# Patient Record
Sex: MALE | Race: WHITE | NOT HISPANIC OR LATINO | Employment: OTHER | URBAN - NONMETROPOLITAN AREA
[De-identification: names, ages, dates, MRNs, and addresses within clinical notes are randomized per-mention and may not be internally consistent; named-entity substitution may affect disease eponyms.]

---

## 2021-04-08 DIAGNOSIS — Z23 ENCOUNTER FOR IMMUNIZATION: ICD-10-CM

## 2023-04-06 ENCOUNTER — TRANSCRIBE ORDERS (OUTPATIENT)
Dept: CARDIOLOGY CLINIC | Facility: CLINIC | Age: 73
End: 2023-04-06

## 2023-04-06 DIAGNOSIS — R97.20 ELEVATED PROSTATE SPECIFIC ANTIGEN (PSA): Primary | ICD-10-CM

## 2023-05-22 ENCOUNTER — LAB (OUTPATIENT)
Dept: LAB | Facility: CLINIC | Age: 73
End: 2023-05-22

## 2023-05-22 ENCOUNTER — APPOINTMENT (OUTPATIENT)
Dept: LAB | Facility: CLINIC | Age: 73
End: 2023-05-22

## 2023-05-22 DIAGNOSIS — R97.20 ELEVATED PROSTATE SPECIFIC ANTIGEN (PSA): ICD-10-CM

## 2023-05-23 LAB
PSA FREE MFR SERPL: 26.4 %
PSA FREE SERPL-MCNC: 0.87 NG/ML
PSA SERPL-MCNC: 3.3 NG/ML (ref 0–4)

## 2023-05-24 ENCOUNTER — TELEPHONE (OUTPATIENT)
Dept: UROLOGY | Facility: CLINIC | Age: 73
End: 2023-05-24

## 2023-05-24 NOTE — TELEPHONE ENCOUNTER
Patient called in post voicemail and wanted to know if he needs to keep fu OV with provider since PSA is improved  Triage RN reviewed providers comments and recommends fu visit o discuss in more detail  Patient will see provider on 6/19/23

## 2023-05-24 NOTE — RESULT ENCOUNTER NOTE
Please let patient know that his repeat PSA improved to 3 3  His percent free PSA was 26 4  Given his age and percent free PSA this gives him a 9% probability of prostate cancer  I do not recommend a MRI or prostate biopsy at this time  I will see him in June for his follow-up and discuss this in more details

## 2023-05-24 NOTE — TELEPHONE ENCOUNTER
----- Message from Ben U  81  sent at 5/24/2023  8:50 AM EDT -----  Please let patient know that his repeat PSA improved to 3 3  His percent free PSA was 26 4  Given his age and percent free PSA this gives him a 9% probability of prostate cancer  I do not recommend a MRI or prostate biopsy at this time  I will see him in June for his follow-up and discuss this in more details

## 2023-06-19 ENCOUNTER — OFFICE VISIT (OUTPATIENT)
Dept: UROLOGY | Facility: CLINIC | Age: 73
End: 2023-06-19
Payer: MEDICARE

## 2023-06-19 VITALS
HEIGHT: 70 IN | TEMPERATURE: 97.5 F | DIASTOLIC BLOOD PRESSURE: 80 MMHG | SYSTOLIC BLOOD PRESSURE: 128 MMHG | BODY MASS INDEX: 22.16 KG/M2 | HEART RATE: 68 BPM | WEIGHT: 154.8 LBS | OXYGEN SATURATION: 100 %

## 2023-06-19 DIAGNOSIS — R97.20 ELEVATED PROSTATE SPECIFIC ANTIGEN (PSA): Primary | ICD-10-CM

## 2023-06-19 PROCEDURE — 99213 OFFICE O/P EST LOW 20 MIN: CPT

## 2023-06-19 NOTE — PROGRESS NOTES
6/19/2023    Chief Complaint   Patient presents with   • Follow-up     No problems or concerns       Assessment and Plan    81 y o  male     1  Elevated PSA  · PSA trend  · 3 3 (5/22/2023)  · 4 67 (3/28/2023)  · PSA free percent of 26 4% with 9% probability of prostate cancer  · PSA now normal and would not recommend a prostate biopsy or MRI imaging  Will recheck this in 6 months with follow-up  History of Present Illness  Nhi Boss is a 81 y o  male here for follow-up evaluation of elevated PSA  Patient was initially seen by me on 4/10/2023 for evaluation of an elevated PSA  He reported prior urologic care in the past over a decade ago with Dr Patrice Prabhakar for history of hydrocele status post right hydrocelectomy over 10 years ago  Denies any other Urologic history  Denies any prior history of BPH and reports mild symptoms of urinary frequency however he does drink coffee about 3 8 oz cups per day and 1 cup of tea piror to bed as well  He reports nocturia 1 times per night  His stream is adequate and denies any straining to urinate or difficulty emptying his bladder       He was referred by his PCP Dr Jordyn Reyes for evaluation of an elevated PSA  He was seen for annual exam and a PSA was ordered on 3/28/2023 as part of routine screening with PSA 4 67  He denies any known family history of prostate cancer  He is unsure if he has had PSA testing in the past and denies any prior history of elevated PSA  You have a repeat PSA total and free completed on 5/22/2023 which showed a PSA of 3 3 and a PSA free percent of 26 4 giving him a 9% probability of prostate cancer  Review of Systems   Constitutional: Negative for chills and fever  HENT: Negative for congestion and sore throat  Respiratory: Negative for cough and shortness of breath  Cardiovascular: Negative for chest pain and leg swelling  Gastrointestinal: Negative for abdominal pain, constipation and diarrhea     Genitourinary: "Negative for difficulty urinating, dysuria, frequency, hematuria and urgency  Musculoskeletal: Negative for back pain and gait problem  Skin: Negative for wound  Allergic/Immunologic: Negative for immunocompromised state  Hematological: Does not bruise/bleed easily  Vitals  Vitals:    06/19/23 0846   BP: 128/80   BP Location: Left arm   Patient Position: Sitting   Pulse: 68   Temp: 97 5 °F (36 4 °C)   SpO2: 100%   Weight: 70 2 kg (154 lb 12 8 oz)   Height: 5' 10\" (1 778 m)       Physical Exam  Vitals reviewed  Constitutional:       General: He is not in acute distress  Appearance: Normal appearance  He is not ill-appearing or toxic-appearing  HENT:      Head: Normocephalic and atraumatic  Eyes:      General: No scleral icterus  Conjunctiva/sclera: Conjunctivae normal    Cardiovascular:      Rate and Rhythm: Normal rate  Pulmonary:      Effort: Pulmonary effort is normal  No respiratory distress  Abdominal:      Tenderness: There is no right CVA tenderness or left CVA tenderness  Hernia: No hernia is present  Musculoskeletal:      Cervical back: Normal range of motion  Right lower leg: No edema  Left lower leg: No edema  Skin:     General: Skin is warm and dry  Coloration: Skin is not jaundiced or pale  Neurological:      General: No focal deficit present  Mental Status: He is alert and oriented to person, place, and time  Mental status is at baseline  Gait: Gait normal    Psychiatric:         Mood and Affect: Mood normal          Behavior: Behavior normal          Thought Content:  Thought content normal          Judgment: Judgment normal          Past History  Past Medical History:   Diagnosis Date   • Elevated PSA      Social History     Socioeconomic History   • Marital status: /Civil Union     Spouse name: None   • Number of children: None   • Years of education: None   • Highest education level: None   Occupational History   • " "None   Tobacco Use   • Smoking status: Never   • Smokeless tobacco: Never   Vaping Use   • Vaping Use: Never used   Substance and Sexual Activity   • Alcohol use: Not Currently     Comment: very rare   • Drug use: Never   • Sexual activity: Yes   Other Topics Concern   • None   Social History Narrative   • None     Social Determinants of Health     Financial Resource Strain: Not on file   Food Insecurity: Not on file   Transportation Needs: Not on file   Physical Activity: Not on file   Stress: Not on file   Social Connections: Not on file   Intimate Partner Violence: Not on file   Housing Stability: Not on file     Social History     Tobacco Use   Smoking Status Never   Smokeless Tobacco Never     Family History   Problem Relation Age of Onset   • Heart disease Father    • No Known Problems Mother        The following portions of the patient's history were reviewed and updated as appropriate allergies, current medications, past medical history, past social history, past surgical history and problem list    Imaging:    Results  No results found for this or any previous visit (from the past 1 hour(s)) ]  Lab Results   Component Value Date    PSA 3 3 05/22/2023     No results found for: \"GLUCOSE\", \"CALCIUM\", \"NA\", \"K\", \"CO2\", \"CL\", \"BUN\", \"CREATININE\"  No results found for: \"WBC\", \"HGB\", \"HCT\", \"MCV\", \"PLT\"    Please Note:  Voice dictation software has been used to create this document  There may be inadvertent transcriptions errors       SORIN Her 06/19/23   "

## 2023-12-05 ENCOUNTER — LAB (OUTPATIENT)
Dept: LAB | Facility: CLINIC | Age: 73
End: 2023-12-05
Payer: MEDICARE

## 2023-12-05 DIAGNOSIS — R97.20 ELEVATED PROSTATE SPECIFIC ANTIGEN (PSA): ICD-10-CM

## 2023-12-05 LAB — PSA SERPL-MCNC: 3.62 NG/ML (ref 0–4)

## 2023-12-05 PROCEDURE — 84153 ASSAY OF PSA TOTAL: CPT

## 2023-12-05 PROCEDURE — 36415 COLL VENOUS BLD VENIPUNCTURE: CPT

## 2023-12-26 PROBLEM — R35.0 URINARY FREQUENCY: Status: ACTIVE | Noted: 2023-12-26

## 2023-12-26 PROBLEM — Z87.898 HISTORY OF ELEVATED PSA: Status: ACTIVE | Noted: 2023-12-26

## 2023-12-26 NOTE — PROGRESS NOTES
"UROLOGY PROGRESS NOTE         NAME: Flo Copeland  AGE: 73 y.o. SEX: male  : 1950   MRN: [id number]    DATE: 2023  TIME: 11:33 AM    Assessment and Plan   Procedures     Impression:   1. Urinary frequency    2. History of elevated PSA    3. Nocturia         Plan: Follow-up in 1 year STALIN PSA patient agrees.      Chief Complaint     Chief Complaint   Patient presents with    Elevated PSA     History of Present Illness     HPI: Flo Copeland is a 73 y.o. year old male who presents with follow-up from [facility] office visit 2023.  Patient with a history of elevated PSA.  PSA was 4.6 on 3/28/2023.  It was 3.3 in May 2023.  The plan was to follow-up in 6 months it was not recommended for him to have a biopsy or an MRI.    Per [facility] note saw Dr. Salgado before with a history of hydrocele status post right hydrocelectomy.  Having some urinary frequency but does drink coffee.  Family history of prostate cancer.  No bothersome urinary complaints.  Recent PSA on 2023 3.62 stable.  No irritable or obstructive voiding complaints doing well.  Discussed his PSA results patient was pleased with the results.          The following portions of the patient's history were reviewed and updated as appropriate: allergies, current medications, past family history, past medical history, past social history, past surgical history and problem list.  Past Medical History:   Diagnosis Date    Elevated PSA      Past Surgical History:   Procedure Laterality Date    APPENDECTOMY       shoulder  Review of Systems     Const: Denies chills, fever and weight loss.  CV: Denies chest pain.  Resp: Denies SOB.  GI: Denies abdominal pain, nausea and vomiting.  : Denies symptoms other than stated above.  Musculo: Denies back pain.    Objective   /80 (BP Location: Left arm, Patient Position: Sitting, Cuff Size: Adult)   Pulse 70   Temp 97.5 °F (36.4 °C)   Ht 5' 10\" (1.778 m)   Wt 73 kg (161 lb)   SpO2 99%   BMI 23.10 " kg/m²     Physical Exam  Const: Appears healthy and well developed. No signs of acute distress present.  Resp: Respirations are regular and unlabored.   CV: Rate is regular. Rhythm is regular.  Abdomen: Abdomen is soft, nontender, and nondistended. Kidneys are not palpable.  : Normal external exam prostate slightly large smooth no masses or nodules  Psych: Patient's attitude is cooperative. Mood is normal. Affect is normal.    Current Medications   No current outpatient medications on file.        Milad Das MD

## 2023-12-28 ENCOUNTER — OFFICE VISIT (OUTPATIENT)
Dept: UROLOGY | Facility: CLINIC | Age: 73
End: 2023-12-28
Payer: MEDICARE

## 2023-12-28 VITALS
DIASTOLIC BLOOD PRESSURE: 80 MMHG | BODY MASS INDEX: 23.05 KG/M2 | OXYGEN SATURATION: 99 % | HEART RATE: 70 BPM | HEIGHT: 70 IN | TEMPERATURE: 97.5 F | SYSTOLIC BLOOD PRESSURE: 140 MMHG | WEIGHT: 161 LBS

## 2023-12-28 DIAGNOSIS — R35.1 NOCTURIA: ICD-10-CM

## 2023-12-28 DIAGNOSIS — Z87.898 HISTORY OF ELEVATED PSA: ICD-10-CM

## 2023-12-28 DIAGNOSIS — R35.0 URINARY FREQUENCY: Primary | ICD-10-CM

## 2023-12-28 PROCEDURE — 99214 OFFICE O/P EST MOD 30 MIN: CPT | Performed by: UROLOGY

## 2024-03-28 ENCOUNTER — TELEPHONE (OUTPATIENT)
Age: 74
End: 2024-03-28

## 2024-03-28 NOTE — TELEPHONE ENCOUNTER
Please send psa script to patients home.     ADDRESS:    [address] [place] [id number]

## 2024-12-03 ENCOUNTER — APPOINTMENT (OUTPATIENT)
Dept: LAB | Facility: CLINIC | Age: 74
End: 2024-12-03
Payer: MEDICARE

## 2024-12-03 DIAGNOSIS — R35.1 NOCTURIA: ICD-10-CM

## 2024-12-03 LAB — PSA SERPL-MCNC: 5.09 NG/ML (ref 0–4)

## 2024-12-03 PROCEDURE — 36415 COLL VENOUS BLD VENIPUNCTURE: CPT

## 2024-12-03 PROCEDURE — 84153 ASSAY OF PSA TOTAL: CPT

## 2024-12-06 ENCOUNTER — TELEPHONE (OUTPATIENT)
Dept: UROLOGY | Facility: CLINIC | Age: 74
End: 2024-12-06

## 2024-12-06 DIAGNOSIS — R97.20 ELEVATED PROSTATE SPECIFIC ANTIGEN (PSA): Primary | ICD-10-CM

## 2024-12-06 RX ORDER — TRIAMCINOLONE ACETONIDE 1 MG/G
CREAM TOPICAL
COMMUNITY
Start: 2024-10-01

## 2024-12-06 NOTE — TELEPHONE ENCOUNTER
Patient returned call. He had PSA done on 12/3. Appt upcoming 12/17.    PSA 12/3/24- 5.090    12/5/23-3.62

## 2024-12-07 PROBLEM — Z80.42 FAMILY HISTORY OF PROSTATE CANCER: Status: ACTIVE | Noted: 2024-12-07

## 2024-12-07 PROBLEM — R97.20 ELEVATED PSA: Status: ACTIVE | Noted: 2024-12-07

## 2024-12-07 NOTE — PROGRESS NOTES
UROLOGY PROGRESS NOTE         NAME: Flo Copeland  AGE: 74 y.o. SEX: male  : 1950   MRN: [id number]    DATE: 2024  TIME: 1:54 PM    Assessment and Plan   Procedures     Impression:   1. Urinary frequency  2. Nocturia  3. Benign prostatic hyperplasia with nocturia       Plan: Patient states does not have a family history of prostate cancer we will soniya that for next year, frequency and nocturia related to fluids, BPH without bothersome symptoms and PSA now back to normal.  Recommend observation follow-up in 1 year patient agrees.      Chief Complaint     Chief Complaint   Patient presents with    Follow-up     PSA 3.549     History of Present Illness     HPI: Flo Copeland is a 74 y.o. year old male who presents with follow-up from office visit 2023.  Patient with a history of nocturia, urinary frequency and history of elevated PSA.    When seen last year patient was not having any irritative or obstructive voiding complaints.  PSA 2023 was 3.6.  He had seen [facility] in the past for elevated PSA require an MRI or a biopsy per his note.  Also saw Dr. Salgado in the past urology for right hydrocelectomy.    Also with a family history of prostate cancer.  Most recent PSA is now up from 3.6 last year up to 5.0.  This was on 12/3/2024    Discussed the option of an MP MRI with patient.    Patient PSA was repeated on 12/3/2024 a week later is back down to 3.5.  Patient was pleased with result all in all voiding well has some frequency related to coffee and nocturia x 1.    The following portions of the patient's history were reviewed and updated as appropriate: allergies, current medications, past family history, past medical history, past social history, past surgical history and problem list.  Past Medical History:   Diagnosis Date    Elevated PSA      Past Surgical History:   Procedure Laterality Date    APPENDECTOMY       shoulder  Review of Systems     Const: Denies chills, fever and  "weight loss.  CV: Denies chest pain.  Resp: Denies SOB.  GI: Denies abdominal pain, nausea and vomiting.  : Denies symptoms other than stated above.  Musculo: Denies back pain.    Objective   /80   Pulse 78   Temp 98.6 °F (37 °C)   Ht 5' 10\" (1.778 m)   Wt 72.8 kg (160 lb 6.4 oz)   SpO2 98%   BMI 23.02 kg/m²     Physical Exam  Const: Appears healthy and well developed. No signs of acute distress present.  Resp: Respirations are regular and unlabored.   CV: Rate is regular. Rhythm is regular.  Abdomen: Abdomen is soft, nontender, and nondistended. Kidneys are not palpable.  : Normal prostate is slightly enlarged no masses or nodules testicular exam normal  Psych: Patient's attitude is cooperative. Mood is normal. Affect is normal.    Current Medications     Current Outpatient Medications:     triamcinolone (KENALOG) 0.1 % cream, APPLY TO RASH AREAS TWICE DAILY FOR 1 WEEK THEN UP TO 3 TIMES PER WEEK AS NEEDED., Disp: , Rfl:         Milad Das MD        "

## 2024-12-10 NOTE — TELEPHONE ENCOUNTER
Telephone call to patient for results request.  Will repeat his PSA prior to appointment on 12/17.  He understands no ejaculation or intercourse prior to repeat testing.  Will also delay if develops any signs of infection for optimal results.

## 2024-12-12 ENCOUNTER — APPOINTMENT (OUTPATIENT)
Dept: LAB | Facility: CLINIC | Age: 74
End: 2024-12-12
Payer: MEDICARE

## 2024-12-12 DIAGNOSIS — R97.20 ELEVATED PROSTATE SPECIFIC ANTIGEN (PSA): ICD-10-CM

## 2024-12-12 PROCEDURE — 84153 ASSAY OF PSA TOTAL: CPT

## 2024-12-12 PROCEDURE — 36415 COLL VENOUS BLD VENIPUNCTURE: CPT

## 2024-12-13 ENCOUNTER — RESULTS FOLLOW-UP (OUTPATIENT)
Dept: OTHER | Facility: HOSPITAL | Age: 74
End: 2024-12-13

## 2024-12-13 LAB — PSA SERPL-MCNC: 3.55 NG/ML (ref 0–4)

## 2024-12-17 ENCOUNTER — OFFICE VISIT (OUTPATIENT)
Dept: UROLOGY | Facility: CLINIC | Age: 74
End: 2024-12-17
Payer: MEDICARE

## 2024-12-17 VITALS
HEART RATE: 78 BPM | BODY MASS INDEX: 22.96 KG/M2 | TEMPERATURE: 98.6 F | WEIGHT: 160.4 LBS | OXYGEN SATURATION: 98 % | DIASTOLIC BLOOD PRESSURE: 80 MMHG | SYSTOLIC BLOOD PRESSURE: 138 MMHG | HEIGHT: 70 IN

## 2024-12-17 DIAGNOSIS — R35.1 NOCTURIA: ICD-10-CM

## 2024-12-17 DIAGNOSIS — R35.1 BENIGN PROSTATIC HYPERPLASIA WITH NOCTURIA: ICD-10-CM

## 2024-12-17 DIAGNOSIS — N40.1 BENIGN PROSTATIC HYPERPLASIA WITH NOCTURIA: ICD-10-CM

## 2024-12-17 DIAGNOSIS — R35.0 URINARY FREQUENCY: Primary | ICD-10-CM

## 2024-12-17 PROCEDURE — 99214 OFFICE O/P EST MOD 30 MIN: CPT | Performed by: UROLOGY
